# Patient Record
Sex: MALE | Race: AMERICAN INDIAN OR ALASKA NATIVE
[De-identification: names, ages, dates, MRNs, and addresses within clinical notes are randomized per-mention and may not be internally consistent; named-entity substitution may affect disease eponyms.]

---

## 2018-05-29 ENCOUNTER — HOSPITAL ENCOUNTER (OUTPATIENT)
Dept: HOSPITAL 5 - SPVWC | Age: 83
Discharge: HOME | End: 2018-05-29
Attending: SURGERY
Payer: MEDICARE

## 2018-05-29 DIAGNOSIS — I10: ICD-10-CM

## 2018-05-29 DIAGNOSIS — N64.4: ICD-10-CM

## 2018-05-29 DIAGNOSIS — K21.9: ICD-10-CM

## 2018-05-29 DIAGNOSIS — E78.00: ICD-10-CM

## 2018-05-29 DIAGNOSIS — N62: Primary | ICD-10-CM

## 2018-05-29 DIAGNOSIS — Z87.891: ICD-10-CM

## 2018-05-29 DIAGNOSIS — G47.30: ICD-10-CM

## 2018-05-29 PROCEDURE — 77066 DX MAMMO INCL CAD BI: CPT

## 2018-05-30 NOTE — ULTRASOUND REPORT
BILATERAL DIGITAL DIAGNOSTIC MAMMOGRAM with CAD and LEFT BREAST 

ULTRASOUND: 05/29/18 



CLINICAL: 82-year-old male with left mastodynia.



COMPARISON:06/06/17 mammogram and left breast ultrasound from [facility]



FINDINGS: The breasts are almost entirely fatty.  Stable moderate left 

subareolar fibroglandular densities.  No mass, architectural distortion 

or suspicious calcifications.The right breast is negative. 



Ultrasound of the left breast (including all four quadrants and the 

retroareolar area) was performed.  Stable retroareolar fibroglandular 

structures.  No change compared to the prior exam.  No mass or cyst.



IMPRESSION: Stable left benign gynecomastia.



BI-RADS CATEGORY:  2 -- Benign



RECOMMENDATION: Clinical follow-up.



COMMENT:

Patient follow-up letters are generated by our [employer] application.

## 2018-05-30 NOTE — MAMMOGRAPHY REPORT
BILATERAL DIGITAL DIAGNOSTIC MAMMOGRAM with CAD and LEFT BREAST 

ULTRASOUND: 05/29/18 



CLINICAL: 82-year-old male with left mastodynia.



COMPARISON:06/06/17 mammogram and left breast ultrasound from [facility]



FINDINGS: The breasts are almost entirely fatty.  Stable moderate left 

subareolar fibroglandular densities.  No mass, architectural distortion 

or suspicious calcifications.The right breast is negative. 



Ultrasound of the left breast (including all four quadrants and the 

retroareolar area) was performed.  Stable retroareolar fibroglandular 

structures.  No change compared to the prior exam.  No mass or cyst.



IMPRESSION: Stable left benign gynecomastia.



BI-RADS CATEGORY:  2 -- Benign



RECOMMENDATION: Clinical follow-up.



COMMENT:

Patient follow-up letters are generated by our [employer] application.

## 2019-06-04 ENCOUNTER — HOSPITAL ENCOUNTER (OUTPATIENT)
Dept: HOSPITAL 5 - SPVWC | Age: 84
Discharge: HOME | End: 2019-06-04
Attending: PHYSICIAN ASSISTANT
Payer: MEDICARE

## 2019-06-04 DIAGNOSIS — N62: Primary | ICD-10-CM

## 2019-06-04 DIAGNOSIS — I10: ICD-10-CM

## 2019-06-04 DIAGNOSIS — E78.00: ICD-10-CM

## 2019-06-04 PROCEDURE — 77066 DX MAMMO INCL CAD BI: CPT

## 2019-06-04 NOTE — MAMMOGRAPHY REPORT
BILATERAL DIGITAL DIAGNOSTIC MAMMOGRAM with CAD and LEFT BREAST 

ULTRASOUND: 06/04/19



CLINICAL: 83-year-old male for followup of left gynecomastia.



COMPARISON:05/29/18



FINDINGS: The breasts are almost entirely fatty.Stable mild left 

retroareolar fibroglandular densities and minimal right retroareolar 

fibroglandular densities.  No mass, architectural distortion or 

suspicious calcifications. 



Ultrasound of the left breast (including all four quadrants and the 

retroareolar area) was performed and demonstrated mild benign 

gynecomastia with mild retroareolar fibroglandular structures.  No 

mass, cyst or shadowing.



IMPRESSION: No evidence of malignancy.Stable mild left benign 

gynecomastia.



BI-RADS CATEGORY:  2 -- Benign



RECOMMENDATION: Clinical follow-up.



COMMENT:

Patient follow-up letters are generated by our [employer] application.

## 2019-06-04 NOTE — ULTRASOUND REPORT
BILATERAL DIGITAL DIAGNOSTIC MAMMOGRAM with CAD and LEFT BREAST 

ULTRASOUND: 06/04/19



CLINICAL: 83-year-old male for followup of left gynecomastia.



COMPARISON:05/29/18



FINDINGS: The breasts are almost entirely fatty.Stable mild left 

retroareolar fibroglandular densities and minimal right retroareolar 

fibroglandular densities.  No mass, architectural distortion or 

suspicious calcifications. 



Ultrasound of the left breast (including all four quadrants and the 

retroareolar area) was performed and demonstrated mild benign 

gynecomastia with mild retroareolar fibroglandular structures.  No 

mass, cyst or shadowing.



IMPRESSION: No evidence of malignancy.Stable mild left benign 

gynecomastia.



BI-RADS CATEGORY:  2 -- Benign



RECOMMENDATION: Clinical follow-up.



COMMENT:

Patient follow-up letters are generated by our [employer] application.